# Patient Record
Sex: MALE | Race: WHITE | Employment: FULL TIME | URBAN - METROPOLITAN AREA
[De-identification: names, ages, dates, MRNs, and addresses within clinical notes are randomized per-mention and may not be internally consistent; named-entity substitution may affect disease eponyms.]

---

## 2017-10-23 ENCOUNTER — HOSPITAL ENCOUNTER (OUTPATIENT)
Dept: CARDIAC CATH/INVASIVE PROCEDURES | Age: 67
Discharge: HOME | End: 2017-10-23
Payer: COMMERCIAL

## 2017-10-23 VITALS
HEART RATE: 54 BPM | DIASTOLIC BLOOD PRESSURE: 86 MMHG | WEIGHT: 315 LBS | OXYGEN SATURATION: 95 % | RESPIRATION RATE: 14 BRPM | BODY MASS INDEX: 37.19 KG/M2 | SYSTOLIC BLOOD PRESSURE: 145 MMHG | TEMPERATURE: 98.1 F | HEIGHT: 77 IN

## 2017-10-23 LAB
GFR NON-AFRICAN AMERICAN: >60 ML/MIN
GFR SERPL CREATININE-BSD FRML MDRD: >60 ML/MIN
GFR SERPL CREATININE-BSD FRML MDRD: NORMAL ML/MIN/{1.73_M2}
GLUCOSE BLD-MCNC: 101 MG/DL (ref 74–100)
PLATELET # BLD: 180 K/UL (ref 140–450)
POC CHLORIDE: 103 MMOL/L (ref 98–107)
POC CREATININE: 0.88 MG/DL (ref 0.51–1.19)
POC HEMATOCRIT: 40 % (ref 41–53)
POC HEMOGLOBIN: 13.6 G/DL (ref 13.5–17.5)
POC POTASSIUM: 3.9 MMOL/L (ref 3.5–4.5)
POC SODIUM: 140 MMOL/L (ref 138–146)

## 2017-10-23 PROCEDURE — 84295 ASSAY OF SERUM SODIUM: CPT

## 2017-10-23 PROCEDURE — 2709999900 HC NON-CHARGEABLE SUPPLY

## 2017-10-23 PROCEDURE — C1769 GUIDE WIRE: HCPCS

## 2017-10-23 PROCEDURE — 84132 ASSAY OF SERUM POTASSIUM: CPT

## 2017-10-23 PROCEDURE — 85049 AUTOMATED PLATELET COUNT: CPT

## 2017-10-23 PROCEDURE — C1725 CATH, TRANSLUMIN NON-LASER: HCPCS

## 2017-10-23 PROCEDURE — 93458 L HRT ARTERY/VENTRICLE ANGIO: CPT | Performed by: INTERNAL MEDICINE

## 2017-10-23 PROCEDURE — 82947 ASSAY GLUCOSE BLOOD QUANT: CPT

## 2017-10-23 PROCEDURE — 82435 ASSAY OF BLOOD CHLORIDE: CPT

## 2017-10-23 PROCEDURE — 7100000010 HC PHASE II RECOVERY - FIRST 15 MIN

## 2017-10-23 PROCEDURE — 2500000003 HC RX 250 WO HCPCS

## 2017-10-23 PROCEDURE — 6360000002 HC RX W HCPCS

## 2017-10-23 PROCEDURE — 7100000011 HC PHASE II RECOVERY - ADDTL 15 MIN

## 2017-10-23 PROCEDURE — 85014 HEMATOCRIT: CPT

## 2017-10-23 PROCEDURE — 82565 ASSAY OF CREATININE: CPT

## 2017-10-23 RX ORDER — SODIUM CHLORIDE 0.9 % (FLUSH) 0.9 %
10 SYRINGE (ML) INJECTION PRN
Status: DISCONTINUED | OUTPATIENT
Start: 2017-10-23 | End: 2017-10-24 | Stop reason: HOSPADM

## 2017-10-23 RX ORDER — SODIUM CHLORIDE 0.9 % (FLUSH) 0.9 %
10 SYRINGE (ML) INJECTION EVERY 12 HOURS SCHEDULED
Status: DISCONTINUED | OUTPATIENT
Start: 2017-10-23 | End: 2017-10-24 | Stop reason: HOSPADM

## 2017-10-23 RX ORDER — ACETAMINOPHEN 325 MG/1
650 TABLET ORAL EVERY 4 HOURS PRN
Status: DISCONTINUED | OUTPATIENT
Start: 2017-10-23 | End: 2017-10-24 | Stop reason: HOSPADM

## 2017-10-23 RX ORDER — ONDANSETRON 2 MG/ML
4 INJECTION INTRAMUSCULAR; INTRAVENOUS EVERY 6 HOURS PRN
Status: DISCONTINUED | OUTPATIENT
Start: 2017-10-23 | End: 2017-10-24 | Stop reason: HOSPADM

## 2017-10-23 RX ORDER — SODIUM CHLORIDE 9 MG/ML
INJECTION, SOLUTION INTRAVENOUS CONTINUOUS
Status: DISCONTINUED | OUTPATIENT
Start: 2017-10-23 | End: 2017-10-24 | Stop reason: HOSPADM

## 2017-10-23 RX ADMIN — SODIUM CHLORIDE: 9 INJECTION, SOLUTION INTRAVENOUS at 12:46

## 2017-10-23 ASSESSMENT — PAIN SCALES - GENERAL
PAINLEVEL_OUTOF10: 0
PAINLEVEL_OUTOF10: 0

## 2017-10-23 NOTE — PROGRESS NOTES
Remainder of air released from TR band , pressure dressing with armboard applied. No hematoma or drainage. Patient walking in hallway. Gait steady while up.

## 2017-10-23 NOTE — H&P
[facility]    Consult / H&P               Today's Date: 10/23/2017  Patient Name: Derek Lopez  Date of admission: 10/23/2017 10:49 AM  Patient's age: 74 y.o., 1950  Admission Dx: No admission diagnoses are documented for this encounter. Reason for Consult:  Cardiac evaluation    Requesting Physician: No admitting provider for patient encounter. CHIEF COMPLAINT:  Positive stress test    History Obtained From:  patient    HISTORY OF PRESENT ILLNESS:      The patient is a 74 y.o.  male who is admitted to the hospital for elective cath. The patient was seen by Dr Miryam Mooney in Aug and had a stress test performed based on his history of CAD which showed inferior ischemia. He is currently having no chest pain and no SOB. He has no recent upcoming surgeries. Past Medical History:   has a past medical history of Abnormal stress test; CAD (coronary artery disease); Hyperlipidemia; and Hypertension. Past Surgical History:   has a past surgical history that includes Coronary angioplasty with stent (2002); Cardiac catheterization (2002, 2012, 2015); and Cardiac catheterization (08/23/2002). Home Medications:    Prior to Admission medications    Medication Sig Start Date End Date Taking?  Authorizing Provider   carvedilol (COREG) 6.25 MG tablet Take 6.25 mg by mouth 2 times daily (with meals)   Yes Historical Provider, MD   lisinopril (PRINIVIL;ZESTRIL) 10 MG tablet Take 10 mg by mouth 2 times daily   Yes Historical Provider, MD   simvastatin (ZOCOR) 20 MG tablet Take 20 mg by mouth nightly   Yes Historical Provider, MD   aspirin 325 MG tablet Take 325 mg by mouth daily   Yes Historical Provider, MD   clopidogrel (PLAVIX) 75 MG tablet Take 1 tablet by mouth daily 8/10/15  Yes Soni Shanks MD      Current Facility-Administered Medications: 0.9 % sodium chloride infusion, , Intravenous, Continuous    Allergies:  Penicillins and Sulfa antibiotics    Social History:   reports S1 and S2.  · Jugular venous pulsation Normal  · The carotid upstroke is normal in amplitude and contour without delay or bruit  · Peripheral pulses are symmetrical and full   Abdomen:   · No masses or tenderness  · Bowel sounds present  Extremities:  ·  No Cyanosis or Clubbing  ·  Lower extremity edema: No  ·  Skin: Warm and dry  Neurological:  · Alert and oriented. · Moves all extremities well  · No abnormalities of mood, affect, memory, mentation, or behavior are noted    DATA:    Diagnostics:      Stress Test:           Labs:     CBC:   Recent Labs      10/23/17   1245   PLT  180     BMP:   Recent Labs      10/23/17   1240   CREATININE  0.88   LABGLOM  >60     BNP: No results for input(s): BNP in the last 72 hours. PT/INR: No results for input(s): PROTIME, INR in the last 72 hours. APTT:No results for input(s): APTT in the last 72 hours. CARDIAC ENZYMES:No results for input(s): CKTOTAL, CKMB, CKMBINDEX, TROPONINI in the last 72 hours. FASTING LIPID PANEL:  Lab Results   Component Value Date    HDL 44 07/05/2016    TRIG 89 07/05/2016     LIVER PROFILE:No results for input(s): AST, ALT, LABALBU in the last 72 hours. IMPRESSION:      1. Positive stress test showing inferolateral ischemia  2. CAD with PTCA and stent of the circumflex in 2002.   3. Hyperlipidemia.   4. Hypertension.   5. Heart catheterization in August 2015, revealing that the patient has significant stenosis in the mid LCX and ostial OM  branch, both required PTCA only, with overall preserved LV function.    6.  Obesity. Patient Active Problem List   Diagnosis    S/P cardiac cath-PTCA LCX/OM 7/10/15-Dr. martins       RECOMMENDATIONS:  1. Proceed with cath to evaluate coronaries      Discussed with patient and Nurse.     Electronically signed by Janel Dooley MD on 10/23/2017 at 1:10 PM  Cardiology Fellow    I have reviewed the case with resident / fellow  I have examined the patient personally  Agree with treatment plan, correction innotes was made as appropriate, and discussed final arrangement based on  my evaluation and exam  Risk and benefit of procedure explained     Procedure was performed by me, with all aspect of the procedure being done using standard protocol.     Darya Cadena MD  [facility]

## 2017-10-23 NOTE — PROGRESS NOTES
Patient post cath to [facility] for recovery. Right wrist soft with TR band dry and intact. Right arm elevated up on pillow and warm blanket applied. Assessment obtained. Resting without complaints with side rails up 2/2 and call light in reach. Wife at bedside.

## 2017-10-23 NOTE — PROGRESS NOTES
All discharge instructions reviewed, questions answered, paper signed and given copy. Patient discharged per wheelchair with family and belongings. 2

## 2017-10-23 NOTE — PROCEDURES
[facility]        Date:   10/23/2017  Patient name: Alex Foster  Date of admission:  10/23/2017 10:49 AM  MRN:   [id number]  YOB: 1950    CARDIAC CATHETERIZATION    Operators:  Primary: Dr Arturo Mars (Attending Physician)  Assistant:     Indications for cath: Positive stress test    Procedure performed: Left heart catheterization, selective coronary angiography, left ventriculography    Catheters used: TIG, JR 4    Access: Right radial artery     Procedure: After informed consent was obtained with explanation of the risks and benefits, patient was brought to the cath lab. The right wrist were prepped and draped in sterile fashion. 1% lidocaine was used for local block. The radial artery was cannulated with 6  Fr sheath with brisk arterial blood return. The side port was frequently flushed and aspirated with normal saline. Findings:  LMCA: Normal 0% stenosis. LAD: Mild irregularities 20-30%. LCx: Patent mid stent area at the bifurcation area of LCX / OM1.    RCA: Mild irregularities 20-30%. The LV gram was performed in the DSOUZA 30 position. LVEF: 55%. Conclusions:  Patent LCX and OM  Normal LV function     Recommendations  Medical treatments  Risk factors modification    Electronically signed by Pineda Tai MD on 10/23/2017 at 2:53 PM  Cardiology Fellow    I have reviewed the case with resident / fellow  I have examined the patient personally  Agree with treatment plan, correction innotes was made as appropriate, and discussed final arrangement based on  my evaluation and exam  Risk and benefit of procedure explained     Procedure was performed by me, with all aspect of the procedure being done using standard protocol.     Birgit Thompson MD  [facility]

## 2018-09-26 ENCOUNTER — HOSPITAL ENCOUNTER (OUTPATIENT)
Age: 68
Setting detail: SPECIMEN
Discharge: HOME | End: 2018-09-26
Payer: MEDICARE

## 2018-09-26 LAB
ALT SERPL-CCNC: 17 U/L (ref 5–41)
ANION GAP SERPL CALCULATED.3IONS-SCNC: 14 MMOL/L (ref 9–17)
AST SERPL-CCNC: 18 U/L
BUN BLDV-MCNC: 19 MG/DL (ref 8–23)
BUN/CREAT BLD: ABNORMAL (ref 9–20)
CALCIUM SERPL-MCNC: 8.9 MG/DL (ref 8.6–10.4)
CHLORIDE BLD-SCNC: 104 MMOL/L (ref 98–107)
CHOLESTEROL/HDL RATIO: 2.7
CHOLESTEROL: 118 MG/DL
CO2: 22 MMOL/L (ref 20–31)
CREAT SERPL-MCNC: 0.98 MG/DL (ref 0.7–1.2)
GFR AFRICAN AMERICAN: >60 ML/MIN
GFR NON-AFRICAN AMERICAN: >60 ML/MIN
GFR SERPL CREATININE-BSD FRML MDRD: ABNORMAL ML/MIN/{1.73_M2}
GFR SERPL CREATININE-BSD FRML MDRD: ABNORMAL ML/MIN/{1.73_M2}
GLUCOSE BLD-MCNC: 119 MG/DL (ref 70–99)
HDLC SERPL-MCNC: 44 MG/DL
LDL CHOLESTEROL: 54 MG/DL (ref 0–130)
POTASSIUM SERPL-SCNC: 4.5 MMOL/L (ref 3.7–5.3)
SODIUM BLD-SCNC: 140 MMOL/L (ref 135–144)
TRIGL SERPL-MCNC: 99 MG/DL
VLDLC SERPL CALC-MCNC: NORMAL MG/DL (ref 1–30)